# Patient Record
Sex: FEMALE | Race: WHITE | Employment: UNEMPLOYED | URBAN - METROPOLITAN AREA
[De-identification: names, ages, dates, MRNs, and addresses within clinical notes are randomized per-mention and may not be internally consistent; named-entity substitution may affect disease eponyms.]

---

## 2017-10-26 ENCOUNTER — OFFICE VISIT (OUTPATIENT)
Dept: FAMILY MEDICINE CLINIC | Age: 22
End: 2017-10-26

## 2017-10-26 VITALS
DIASTOLIC BLOOD PRESSURE: 82 MMHG | HEIGHT: 63 IN | WEIGHT: 128 LBS | BODY MASS INDEX: 22.68 KG/M2 | OXYGEN SATURATION: 99 % | HEART RATE: 83 BPM | SYSTOLIC BLOOD PRESSURE: 122 MMHG

## 2017-10-26 DIAGNOSIS — R19.5 LOOSE STOOLS: ICD-10-CM

## 2017-10-26 DIAGNOSIS — Z00.00 HEALTHCARE MAINTENANCE: Primary | ICD-10-CM

## 2017-10-26 PROCEDURE — 90472 IMMUNIZATION ADMIN EACH ADD: CPT | Performed by: FAMILY MEDICINE

## 2017-10-26 PROCEDURE — 90715 TDAP VACCINE 7 YRS/> IM: CPT | Performed by: FAMILY MEDICINE

## 2017-10-26 PROCEDURE — 90732 PPSV23 VACC 2 YRS+ SUBQ/IM: CPT | Performed by: FAMILY MEDICINE

## 2017-10-26 PROCEDURE — 99395 PREV VISIT EST AGE 18-39: CPT | Performed by: FAMILY MEDICINE

## 2017-10-26 PROCEDURE — 90471 IMMUNIZATION ADMIN: CPT | Performed by: FAMILY MEDICINE

## 2017-10-26 NOTE — PATIENT INSTRUCTIONS
Patient Education        Learning About Pap Tests  What is a Pap test?  The Pap test (also called a Pap smear) is a screening test for cancer of the cervix, which is the lower part of the uterus that opens into the vagina. The test can help your doctor find early changes in the cells that could lead to cancer. During the test, the doctor or nurse will insert a tool called a speculum into your vagina. The speculum gently spreads apart the vaginal walls. That allows your doctor to see inside the vagina and the cervix. He or she uses a cotton swab or brush to collect cell samples from your cervix. Try to schedule the test when you're not having your period. To get ready for a Pap test, avoid douches, tampons, vaginal medicines, sprays, or powders for at least a day before you have the test.  When should you have a Pap test?  Women should start having Pap tests at age 24. If you are younger than 24 and are sexually active, it's still a good idea to have regular testing for sexually transmitted infections. · Women 21 to 30 should have Pap tests every 3 years. · Women 30 to 72 may continue to have a Pap test every 3 years as long as their results are normal. Or they can choose to have a Pap test and an HPV test. This is called co-testing. With co-testing, women can have screening every 5 years as long as their test results are normal.  · Women 72 and older may no longer need Pap tests. When to stop having Pap tests depends on your medical history, your overall health, and your risk of cervical cell changes or cervical cancer. Talk with your doctor about whether you should stop or continue to have Pap tests. He or she can help you decide. These recommendations do not apply to women who have had a serious abnormal Pap test result or who have certain health problems. Talk to your doctor about how often you should be tested. There is also a newer test called a primary HPV test. It is used in women ages 22 and older. Your doctor places the IUD in your uterus. You have a choice between a hormonal IUD and a copper IUD. The hormonal IUD prevents pregnancy by damaging or killing sperm. It also releases a type of the hormone progestin. Progestin prevents pregnancy in these ways: It thickens the mucus in the cervix. This makes it hard for sperm to travel into the uterus. It also thins the lining of the uterus, which makes it harder for a fertilized egg to attach to the uterus. Progestin can sometimes stop the ovaries from releasing an egg each month (ovulation). Hormonal IUDs prevent pregnancy for 3 to 5 years, depending on which IUD is used. Once you have it, you don't have to do anything else to prevent pregnancy. The copper IUD is wrapped in copper wire. Copper IUDs prevent pregnancy by making the uterus and fallopian tubes produce a fluid that kills sperm. The copper IUD prevents pregnancy for 10 years. Once you have it, you don't have to do anything else to prevent pregnancy. A string tied to the end of the IUD hangs down through the opening of the uterus (called the cervix) into the vagina. You can check that the IUD is in place by feeling for the string. The IUD usually stays in the uterus until your doctor removes it. How well does it work? In the first year of use:  · When the hormonal IUD is used exactly as directed, fewer than 1 woman out of 100 has an unplanned pregnancy. · When the copper IUD is used exactly as directed, fewer than 1 woman out of 100 has an unplanned pregnancy. Be sure to tell your doctor about any health problems you have or medicines you take. He or she can help you choose the birth control method that is right for you. What are the advantages of an IUD? · An IUD is one of the most effective methods of birth control. · It prevents pregnancy for 3 to 10 years, depending on the type. You don't have to worry about birth control during this time. · It's safe to use while breastfeeding.   · IUDs don't contain estrogen. So you can use an IUD if you don't want to take estrogen or can't take estrogen because you have certain health problems or concerns. · An IUD is convenient. It is always providing birth control. You don't need to remember to take a pill or get a shot. You don't have to interrupt sex to protect against pregnancy. · A hormonal IUD may reduce heavy bleeding and cramping. What are the disadvantages of an IUD? · An IUD doesn't protect against sexually transmitted infections (STIs), such as herpes or HIV/AIDS. If you aren't sure if your sex partner might have an STI, use a condom to protect against disease. · A copper IUD may cause periods with more bleeding and cramping. · You have to see a doctor to have an IUD inserted and removed. · You have to check to see if the string is in place. Where can you learn more? Go to https://[employer]. org and sign in to your [employer] account. Enter R993 in the [employer] box to learn more about \"Learning About Birth Control: Intrauterine Device (IUD). \"     If you do not have an account, please click on the \"Sign Up Now\" link. Current as of: March 16, 2017  Content Version: 11.3  © 4844-7238 [employer]. Care instructions adapted under license by [facility] [place] ([facility]). If you have questions about a medical condition or this instruction, always ask your healthcare professional. [place] [address] any warranty or liability for your use of this information. Patient Education        Learning About Birth Control: The Shot  What is the shot? The shot is used to prevent pregnancy. You get the shot in your upper arm or rear end (buttocks). The shot gives you a dose of the hormone progestin. The shot is often called by its brand name, Depo-Provera. Progestin prevents pregnancy in these ways: It thickens the mucus in the cervix. This makes it hard for sperm to travel into the uterus.  It also thins the lining of the uterus, which makes it harder for a fertilized egg to attach to the uterus. Progestin can sometimes stop the ovaries from releasing an egg each month (ovulation). The shot provides birth control for 3 months at a time. You then need another shot. The shot can cause bone loss. Most women can use it safely for up to 2 years and then may choose to switch to another form of birth control. Some women may be able to use the shot for more than 2 years. How well does it work? In the first year of use:  · When the shot is used exactly as directed, fewer than 1 woman out of 100 has an unplanned pregnancy. · When the shot is not used exactly as directed, 6 women out of 100 have an unplanned pregnancy. Be sure to tell your doctor about any health problems you have or medicines you take. He or she can help you choose the birth control method that is right for you. What are the advantages of the shot? · The shot is one of the most effective methods of birth control. · It's convenient. You need to get a shot only once every 3 months to prevent pregnancy. You don't have to interrupt sex to protect against pregnancy. · It prevents pregnancy for 3 months at a time. You don't have to worry about birth control for this time. · It's safe to use while breastfeeding. · The shot may reduce heavy bleeding and cramping. · The shot doesn't contain estrogen. So you can use it if you don't want to take estrogen or can't take estrogen because you have certain health problems or concerns. What are the disadvantages of the shot? · The shot doesn't protect against sexually transmitted infections (STIs), such as herpes or HIV/AIDS. If you aren't sure if your sex partner might have an STI, use a condom to protect against disease. · The shot may cause bone loss in some women. You shouldn't use this method for more than 2 years without talking to your doctor first about the risks and benefits.   · Any side effects may last

## 2017-10-26 NOTE — PROGRESS NOTES
Allergies  Outpatient Prescriptions Marked as Taking for the 10/26/17 encounter (Office Visit) with Evans To MD   Medication Sig Dispense Refill    tretinoin (RETIN-A) 0.025 % cream Apply topically nightly. 30 g 1       History reviewed. No pertinent past medical history. History reviewed. No pertinent surgical history. Family History   Problem Relation Age of Onset    Stroke Maternal Grandfather     Heart Disease Paternal Grandfather      Social History     Social History    Marital status: Single     Spouse name: N/A    Number of children: N/A    Years of education: N/A     Occupational History    Not on file. Social History Main Topics    Smoking status: Never Smoker    Smokeless tobacco: Never Used    Alcohol use Yes      Comment: 1 drink weekly    Drug use: No    Sexual activity: Yes     Partners: Male     Other Topics Concern    Not on file     Social History Narrative    No narrative on file       Review of Systems:  A comprehensive review of systems was negative except for what was noted in the HPI. Physical Exam:   Vitals:    10/26/17 1042   BP: 122/82   Site: Left Arm   Pulse: 83   SpO2: 99%   Weight: 128 lb (58.1 kg)   Height: 5' 3\" (1.6 m)     Body mass index is 22.67 kg/m². Constitutional: She is oriented to person, place, and time. She appears well-developed and well-nourished. No distress. HEENT:   Head: Normocephalic and atraumatic. Right Ear: Tympanic membrane, external ear and ear canal normal.   Left Ear: Tympanic membrane, external ear and ear canal normal.   Nose: Nose normal.   Mouth/Throat: Oropharynx is clear and moist, and mucous membranes are normal.  There is no cervical adenopathy. Eyes: Conjunctivae and extraocular motions are normal. Pupils are equal, round, and reactive to light. Neck: Neck supple. No mass and no thyromegaly present. Cardiovascular: Normal rate, regular rhythm, normal heart sounds. Exam reveals no gallop and no friction rub.

## 2017-10-27 DIAGNOSIS — Z00.00 HEALTHCARE MAINTENANCE: ICD-10-CM

## 2017-10-27 LAB
C. TRACHOMATIS DNA ,URINE: NEGATIVE
CHOLESTEROL, TOTAL: 163 MG/DL (ref 0–199)
GLUCOSE BLD-MCNC: 86 MG/DL (ref 70–99)
HBV SURFACE AB TITR SER: <3.5 MUL/ML
HDLC SERPL-MCNC: 61 MG/DL (ref 40–60)
HEPATITIS B SURFACE ANTIGEN INTERPRETATION: NORMAL
HEPATITIS C ANTIBODY INTERPRETATION: NORMAL
LDL CHOLESTEROL CALCULATED: 85 MG/DL
N. GONORRHOEAE DNA, URINE: NEGATIVE
TRIGL SERPL-MCNC: 83 MG/DL (ref 0–150)
VLDLC SERPL CALC-MCNC: 17 MG/DL

## 2017-10-28 LAB — RPR: NORMAL

## 2017-10-30 LAB — HIV-1 AND HIV-2 ANTIBODIES: NORMAL

## 2017-11-27 ENCOUNTER — TELEPHONE (OUTPATIENT)
Dept: FAMILY MEDICINE CLINIC | Age: 22
End: 2017-11-27

## 2018-08-07 ENCOUNTER — OFFICE VISIT (OUTPATIENT)
Dept: FAMILY MEDICINE CLINIC | Age: 23
End: 2018-08-07

## 2018-08-07 VITALS
HEART RATE: 78 BPM | SYSTOLIC BLOOD PRESSURE: 112 MMHG | BODY MASS INDEX: 23.18 KG/M2 | WEIGHT: 130.8 LBS | DIASTOLIC BLOOD PRESSURE: 78 MMHG | OXYGEN SATURATION: 99 % | HEIGHT: 63 IN | RESPIRATION RATE: 14 BRPM

## 2018-08-07 DIAGNOSIS — Z00.00 HEALTHCARE MAINTENANCE: Primary | ICD-10-CM

## 2018-08-07 DIAGNOSIS — J45.990 EXERCISE-INDUCED ASTHMA: ICD-10-CM

## 2018-08-07 DIAGNOSIS — Z11.3 SCREEN FOR STD (SEXUALLY TRANSMITTED DISEASE): ICD-10-CM

## 2018-08-07 DIAGNOSIS — Z30.9 ENCOUNTER FOR CONTRACEPTIVE MANAGEMENT, UNSPECIFIED TYPE: ICD-10-CM

## 2018-08-07 LAB
HEPATITIS B CORE IGM ANTIBODY: NORMAL
HEPATITIS B SURFACE ANTIGEN INTERPRETATION: NORMAL
HEPATITIS C ANTIBODY INTERPRETATION: NORMAL

## 2018-08-07 PROCEDURE — 99395 PREV VISIT EST AGE 18-39: CPT | Performed by: NURSE PRACTITIONER

## 2018-08-07 ASSESSMENT — ENCOUNTER SYMPTOMS
ABDOMINAL DISTENTION: 0
DIARRHEA: 0
RHINORRHEA: 0
VOMITING: 0
BACK PAIN: 0
COUGH: 0
BLOOD IN STOOL: 0
EYE PAIN: 0
EYE REDNESS: 0
CHEST TIGHTNESS: 0
SHORTNESS OF BREATH: 0
CONSTIPATION: 0
NAUSEA: 0
WHEEZING: 0
SINUS PRESSURE: 0
ABDOMINAL PAIN: 0
APNEA: 0

## 2018-08-07 ASSESSMENT — PATIENT HEALTH QUESTIONNAIRE - PHQ9
SUM OF ALL RESPONSES TO PHQ QUESTIONS 1-9: 0
SUM OF ALL RESPONSES TO PHQ9 QUESTIONS 1 & 2: 0
2. FEELING DOWN, DEPRESSED OR HOPELESS: 0
1. LITTLE INTEREST OR PLEASURE IN DOING THINGS: 0

## 2018-08-07 NOTE — PROGRESS NOTES
8/7/2018    This is a 25 y.o. female   Chief Complaint   Patient presents with    Annual Exam     Patient is fasting    . HPI     Here for annual exam.  Overall feeling well. Denies concerns today. Overall healthy diet and exercise - could do better    Due for pap. Denies concerns for STI. Would like to have STI screen today. New sexual partner, denies s/s of STD. Would like to see GYN for IUD placement. History reviewed. No pertinent past medical history. History reviewed. No pertinent surgical history. Social History     Social History    Marital status: Single     Spouse name: N/A    Number of children: N/A    Years of education: N/A     Occupational History    Not on file. Social History Main Topics    Smoking status: Never Smoker    Smokeless tobacco: Never Used    Alcohol use Yes      Comment: 1 drink weekly    Drug use: No    Sexual activity: Yes     Partners: Male     Other Topics Concern    Not on file     Social History Narrative    10/26/17  Patient is studying [employer] in [place]. Family History   Problem Relation Age of Onset    Stroke Maternal Grandfather     Heart Disease Paternal Grandfather        Current Outpatient Prescriptions   Medication Sig Dispense Refill    triamcinolone (ARISTOCORT) 0.5 % ointment   1    tretinoin (RETIN-A) 0.025 % cream Apply topically nightly. 30 g 1     No current facility-administered medications for this visit.       No Known Allergies    Orders Only on 10/27/2017   Component Date Value Ref Range Status    Cholesterol, Total 10/27/2017 163  0 - 199 mg/dL Final    Triglycerides 10/27/2017 83  0 - 150 mg/dL Final    HDL 10/27/2017 61* 40 - 60 mg/dL Final    LDL Calculated 10/27/2017 85  <100 mg/dL Final    VLDL Cholesterol Calculated 10/27/2017 17  Not Established mg/dL Final    HIV-1/HIV-2 Ab 10/27/2017 Non-reactive  Non-reactive Final    Glucose 10/27/2017 86  70 - 99 mg/dL Final    RPR 10/27/2017

## 2018-08-08 LAB
C. TRACHOMATIS DNA ,URINE: POSITIVE
HIV AG/AB: NORMAL
HIV ANTIGEN: NORMAL
HIV-1 ANTIBODY: NORMAL
HIV-2 AB: NORMAL
N. GONORRHOEAE DNA, URINE: NEGATIVE
TOTAL SYPHILLIS IGG/IGM: NORMAL

## 2018-08-08 RX ORDER — AZITHROMYCIN 250 MG/1
1000 TABLET, FILM COATED ORAL ONCE
Qty: 4 TABLET | Refills: 0 | Status: SHIPPED | OUTPATIENT
Start: 2018-08-08 | End: 2018-08-08